# Patient Record
(demographics unavailable — no encounter records)

---

## 2025-07-09 NOTE — HISTORY OF PRESENT ILLNESS
[de-identified] : KADIE GEORGE is a 44 year y/o F with PMH of anxiety and migraines who presents as a new patient today to establish care. CC annual physical, referred by friend Susanna Bowman    PMH: bad menstrual cramps, anxiety was on prozac for a few years during Covid. Has a psychiatrist Pradeep Caputo who prescribes xanax and propranolol PRN for situational anxiety, migraines has a neurologist, follows with Gyn gets regular mammograms PSH: wisdom teeth Fam Hx: see chart Medications: flonase for seasonal allergies, Mg nightly for migraines, Ubrelvy PRN migraines, fish oil, glucosamine with turmeric, B-complex, vitamin D 2000 IU, gabapentin 100 mg qhs, hydroxyzine 10 mg PRN, propranolol PRN, xanax when flying Allergies: shellfish has epipen, needs refill Social History: volunteers at [employer] Lives with , 2 cats named Tiny 6 y/o and Brisko both girls. Brisko 2 y/o house panther. 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 Has been feeling a little bit artistically sad lately, working on figuring it out. Goes to tap class weekly.  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  Gardasil: declines Pap: UTD, no h/o abn, follows with ObGyn STI screen: not today Family Planning: bad cramps over last few years, had copper IUD until a few years ago. Gets GERD and cramps during cycle, takes Midol and reflex medication, drinks raspberry tea.  Mammogram: gets annually

## 2025-07-09 NOTE — ASSESSMENT
[Vaccines Reviewed] : Immunizations reviewed today. Please see immunization details in the vaccine log within the immunization flowsheet.  [FreeTextEntry1] : #HM Pt presents for annual physical today. VSS. We reviewed age-appropriate screenings and general health maintenance. Thinks UTD on vaccines: unsure of last Tdap, declines Gardasil UTD on cancer screening: no h/o abn pap, UTD on mammogram Does not need STI screening today Would like bloodwork today - Bloodwork today - Vaccine appt for tdap if due - PT and acupuncture referral for back pain - Get records - F/u 1 year, sooner if needed

## 2025-07-09 NOTE — HEALTH RISK ASSESSMENT
[Yes] : Yes [Monthly or less (1 pt)] : Monthly or less (1 point) [1 or 2 (0 pts)] : 1 or 2 (0 points) [Never (0 pts)] : Never (0 points) [0] : 2) Feeling down, depressed, or hopeless: Not at all (0) [PHQ-2 Negative - No further assessment needed] : PHQ-2 Negative - No further assessment needed [Patient reported mammogram was normal] : Patient reported mammogram was normal [Patient reported PAP Smear was normal] : Patient reported PAP Smear was normal [HIV test declined] : HIV test declined [With Significant Other] : lives with significant other [Employed] : employed [] :  [Sexually Active] : sexually active [Never] : Never [NO] : No [PapSmearComments] : no h/o abn, follows with Gyn

## 2025-07-09 NOTE — HISTORY OF PRESENT ILLNESS
[de-identified] : KADIE GEORGE is a 44 year y/o F with PMH of anxiety and migraines who presents as a new patient today to establish care. CC annual physical, referred by friend Susanna Bowman    PMH: bad menstrual cramps, anxiety was on prozac for a few years during Covid. Has a psychiatrist Pradeep Caputo who prescribes xanax and propranolol PRN for situational anxiety, migraines has a neurologist, follows with Gyn gets regular mammograms PSH: wisdom teeth Fam Hx: see chart Medications: flonase for seasonal allergies, Mg nightly for migraines, Ubrelvy PRN migraines, fish oil, glucosamine with turmeric, B-complex, vitamin D 2000 IU, gabapentin 100 mg qhs, hydroxyzine 10 mg PRN, propranolol PRN, xanax when flying Allergies: shellfish has epipen, needs refill Social History: volunteers at [employer] Lives with , 2 cats named Tiny 4 y/o and Brisko both girls. Brisko 2 y/o house panther. 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 Has been feeling a little bit artistically sad lately, working on figuring it out. Goes to tap class weekly.  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  Gardasil: declines Pap: UTD, no h/o abn, follows with ObGyn STI screen: not today Family Planning: bad cramps over last few years, had copper IUD until a few years ago. Gets GERD and cramps during cycle, takes Midol and reflex medication, drinks raspberry tea.  Mammogram: gets annually

## 2025-07-09 NOTE — PHYSICAL EXAM
[No Acute Distress] : no acute distress [Well-Appearing] : well-appearing [Normal Voice/Communication] : normal voice/communication [Coordination Grossly Intact] : coordination grossly intact [Normal Gait] : normal gait [Speech Grossly Normal] : speech grossly normal [Alert and Oriented x3] : oriented to person, place, and time [Normal Mood] : the mood was normal [Soft] : abdomen soft [Non Tender] : non-tender [Non-distended] : non-distended [Normal Bowel Sounds] : normal bowel sounds [Normal] : no posterior cervical lymphadenopathy and no anterior cervical lymphadenopathy